# Patient Record
Sex: FEMALE | Race: WHITE | NOT HISPANIC OR LATINO | Employment: UNEMPLOYED | URBAN - METROPOLITAN AREA
[De-identification: names, ages, dates, MRNs, and addresses within clinical notes are randomized per-mention and may not be internally consistent; named-entity substitution may affect disease eponyms.]

---

## 2017-02-06 DIAGNOSIS — Z12.31 ENCOUNTER FOR SCREENING MAMMOGRAM FOR MALIGNANT NEOPLASM OF BREAST: ICD-10-CM

## 2017-02-15 ENCOUNTER — ALLSCRIPTS OFFICE VISIT (OUTPATIENT)
Dept: OTHER | Facility: OTHER | Age: 52
End: 2017-02-15

## 2017-02-17 ENCOUNTER — GENERIC CONVERSION - ENCOUNTER (OUTPATIENT)
Dept: OTHER | Facility: OTHER | Age: 52
End: 2017-02-17

## 2017-10-08 ENCOUNTER — OFFICE VISIT (OUTPATIENT)
Dept: URGENT CARE | Facility: MEDICAL CENTER | Age: 52
End: 2017-10-08
Payer: COMMERCIAL

## 2017-10-08 PROCEDURE — G0382 LEV 3 HOSP TYPE B ED VISIT: HCPCS

## 2018-01-14 VITALS
SYSTOLIC BLOOD PRESSURE: 122 MMHG | HEIGHT: 61 IN | WEIGHT: 171 LBS | BODY MASS INDEX: 32.28 KG/M2 | DIASTOLIC BLOOD PRESSURE: 80 MMHG

## 2018-01-17 NOTE — RESULT NOTES
Verified Results  DX[facility] [address] [id number] Ernestine Morrison     Test Name Result Flag Reference   DXA US BONE DENSITY HEEL +1 4 RF

## 2018-07-03 ENCOUNTER — ANNUAL EXAM (OUTPATIENT)
Dept: GYNECOLOGY | Facility: CLINIC | Age: 53
End: 2018-07-03
Payer: COMMERCIAL

## 2018-07-03 VITALS
SYSTOLIC BLOOD PRESSURE: 144 MMHG | DIASTOLIC BLOOD PRESSURE: 94 MMHG | WEIGHT: 181.4 LBS | BODY MASS INDEX: 34.25 KG/M2 | HEIGHT: 61 IN

## 2018-07-03 DIAGNOSIS — Z12.31 ENCOUNTER FOR SCREENING MAMMOGRAM FOR MALIGNANT NEOPLASM OF BREAST: Primary | ICD-10-CM

## 2018-07-03 DIAGNOSIS — R63.5 WEIGHT GAIN: ICD-10-CM

## 2018-07-03 DIAGNOSIS — Z01.419 ENCOUNTER FOR GYNECOLOGICAL EXAMINATION WITHOUT ABNORMAL FINDING: ICD-10-CM

## 2018-07-03 PROCEDURE — S0612 ANNUAL GYNECOLOGICAL EXAMINA: HCPCS | Performed by: OBSTETRICS & GYNECOLOGY

## 2018-07-03 NOTE — PROGRESS NOTES
Assessment/Plan:         Diagnoses and all orders for this visit:    Encounter for screening mammogram for malignant neoplasm of breast  -     Mammo screening bilateral w 3d & cad; Future    Weight gain  -     TSH, 3rd generation with Free T4 reflex; Future    Encounter for gynecological examination without abnormal finding        Subjective:      Patient ID: Gilbret Champion is a 47 y o  female  HPI  Prior H  C/O weight gain  No other c/o   Colonoscopy 2015--nml  Heel scan 2017 + 1 4    The following portions of the patient's history were reviewed and updated as appropriate: allergies, current medications, past family history, past medical history, past social history, past surgical history and problem list     Review of Systems   Constitutional: Negative  Gastrointestinal: Negative  Genitourinary: Negative  Objective:      /94 (BP Location: Left arm)   Ht 5' 1" (1 549 m)   Wt 82 3 kg (181 lb 6 4 oz)   BMI 34 28 kg/m²          Physical Exam   Constitutional: She appears well-developed and well-nourished  Neck: Normal range of motion  Neck supple  No thyromegaly present  Cardiovascular: Normal rate, regular rhythm and normal heart sounds  Pulmonary/Chest: Right breast exhibits no inverted nipple, no mass, no nipple discharge, no skin change and no tenderness  Left breast exhibits no inverted nipple, no mass, no nipple discharge, no skin change and no tenderness  Abdominal: Soft  Bowel sounds are normal  She exhibits no distension and no mass  There is no tenderness  Hernia confirmed negative in the right inguinal area and confirmed negative in the left inguinal area  Genitourinary: There is no rash or lesion on the right labia  There is no rash or lesion on the left labia  Right adnexum displays no mass, no tenderness and no fullness  Left adnexum displays no mass, no tenderness and no fullness  No erythema or bleeding in the vagina  No vaginal discharge found     Genitourinary Comments: Prior TLH   Lymphadenopathy:        Right: No inguinal adenopathy present  Left: No inguinal adenopathy present

## 2018-07-17 LAB — TSH SERPL-ACNC: 1.21 MIU/L

## 2019-01-10 DIAGNOSIS — Z12.31 ENCOUNTER FOR SCREENING MAMMOGRAM FOR MALIGNANT NEOPLASM OF BREAST: Primary | ICD-10-CM

## 2020-12-22 ENCOUNTER — HOSPITAL ENCOUNTER (OUTPATIENT)
Dept: MAMMOGRAPHY | Facility: HOSPITAL | Age: 55
Discharge: HOME | End: 2020-12-22
Payer: COMMERCIAL

## 2020-12-22 VITALS — HEIGHT: 61 IN | WEIGHT: 181 LBS | BODY MASS INDEX: 34.17 KG/M2

## 2020-12-22 DIAGNOSIS — Z12.31 ENCOUNTER FOR SCREENING MAMMOGRAM FOR MALIGNANT NEOPLASM OF BREAST: ICD-10-CM

## 2020-12-22 PROCEDURE — 77063 BREAST TOMOSYNTHESIS BI: CPT

## 2020-12-22 PROCEDURE — 77067 SCR MAMMO BI INCL CAD: CPT

## 2021-05-04 ENCOUNTER — TELEPHONE (OUTPATIENT)
Dept: INTERNAL MEDICINE CLINIC | Facility: CLINIC | Age: 56
End: 2021-05-04

## 2021-05-04 DIAGNOSIS — E55.9 VITAMIN D DEFICIENCY: Primary | ICD-10-CM

## 2021-05-04 DIAGNOSIS — E78.1 HYPERTRIGLYCERIDEMIA: ICD-10-CM

## 2021-05-04 NOTE — TELEPHONE ENCOUNTER
Dwight Smith I ordered blood test that she needs before next appointment    His fasting blood test ,you  can e-mail

## 2021-05-04 NOTE — TELEPHONE ENCOUNTER
Has her next apt 6/15 - she thought you wanted BW done - there are no orders in Epic - do you want to order if so - email orders to her

## 2021-06-10 LAB
25(OH)D3 SERPL-MCNC: 32 NG/ML (ref 30–100)
CHOLEST SERPL-MCNC: 212 MG/DL
CHOLEST/HDLC SERPL: 4.9 (CALC)
HDLC SERPL-MCNC: 43 MG/DL
LDLC SERPL CALC-MCNC: 135 MG/DL (CALC)
NONHDLC SERPL-MCNC: 169 MG/DL (CALC)
TRIGL SERPL-MCNC: 205 MG/DL

## 2021-06-13 PROBLEM — E78.1 HYPERTRIGLYCERIDEMIA: Status: ACTIVE | Noted: 2021-06-13

## 2021-06-13 PROBLEM — E04.2 MULTIPLE THYROID NODULES: Status: ACTIVE | Noted: 2021-06-13

## 2021-06-13 PROBLEM — E55.9 VITAMIN D DEFICIENCY: Status: ACTIVE | Noted: 2021-06-13

## 2021-06-13 PROBLEM — E78.6 LOW HDL (UNDER 40): Status: ACTIVE | Noted: 2021-06-13

## 2021-06-15 ENCOUNTER — OFFICE VISIT (OUTPATIENT)
Dept: FAMILY MEDICINE CLINIC | Facility: CLINIC | Age: 56
End: 2021-06-15
Payer: COMMERCIAL

## 2021-06-15 ENCOUNTER — TELEPHONE (OUTPATIENT)
Dept: FAMILY MEDICINE CLINIC | Facility: CLINIC | Age: 56
End: 2021-06-15

## 2021-06-15 VITALS
DIASTOLIC BLOOD PRESSURE: 82 MMHG | HEIGHT: 61 IN | TEMPERATURE: 98.2 F | BODY MASS INDEX: 34.17 KG/M2 | SYSTOLIC BLOOD PRESSURE: 130 MMHG | HEART RATE: 80 BPM | WEIGHT: 181 LBS | OXYGEN SATURATION: 98 %

## 2021-06-15 DIAGNOSIS — R03.0 ELEVATED BP WITHOUT DIAGNOSIS OF HYPERTENSION: ICD-10-CM

## 2021-06-15 DIAGNOSIS — E04.2 MULTIPLE THYROID NODULES: ICD-10-CM

## 2021-06-15 DIAGNOSIS — E78.2 MIXED HYPERLIPIDEMIA: Primary | ICD-10-CM

## 2021-06-15 DIAGNOSIS — K63.5 POLYP OF COLON, UNSPECIFIED PART OF COLON, UNSPECIFIED TYPE: ICD-10-CM

## 2021-06-15 DIAGNOSIS — E55.9 VITAMIN D DEFICIENCY: ICD-10-CM

## 2021-06-15 PROCEDURE — 3725F SCREEN DEPRESSION PERFORMED: CPT | Performed by: INTERNAL MEDICINE

## 2021-06-15 PROCEDURE — 3008F BODY MASS INDEX DOCD: CPT | Performed by: INTERNAL MEDICINE

## 2021-06-15 PROCEDURE — 99214 OFFICE O/P EST MOD 30 MIN: CPT | Performed by: INTERNAL MEDICINE

## 2021-06-15 PROCEDURE — 1036F TOBACCO NON-USER: CPT | Performed by: INTERNAL MEDICINE

## 2021-06-15 RX ORDER — CHOLECALCIFEROL (VITAMIN D3) 125 MCG
1 CAPSULE ORAL DAILY
COMMUNITY

## 2021-06-15 RX ORDER — OMEGA-3S/DHA/EPA/FISH OIL/D3 300MG-1000
400 CAPSULE ORAL DAILY
COMMUNITY
End: 2021-06-15 | Stop reason: SDUPTHER

## 2021-06-15 NOTE — PROGRESS NOTES
Assessment/Plan:    1  Mixed hyperlipidemia  Assessment & Plan:  Cholesterol increased from 194-212  Triglyceride increased from 2 1-205    Patient does not want any medication for  hyperlipidemia  She is planning to exercise and lose weight  She is planning to lose weight about 40 to 50 lbs  Advised for low-cholesterol low saturated fat diet  Orders:  -     Lipid panel; Future    2  Vitamin D deficiency  Assessment & Plan:  Vitamin-D level significantly improved from 12 to 32  Patient states sometimes he forgets her vitamin-D tablet  Advised to take daily 5000 IU  Orders:  -     CBC and differential; Future  -     Comprehensive metabolic panel; Future    3  BMI 34 0-34 9,adult    4  Polyp of colon, unspecified part of colon, unspecified type  Assessment & Plan:  Last colonoscopy was done November 2015 by Dr Kd Hall to check with her GI regarding follow-up colonoscopy      5  Multiple thyroid nodules  Assessment & Plan:  She was seen by endocrinologist 2010 had biopsy which was benign last ultrasound December 2020 unchanged has a multinodular goiter    Orders:  -     CBC and differential; Future  -     Comprehensive metabolic panel; Future    6  Elevated BP without diagnosis of hypertension  Assessment & Plan:  Discussed with the patient blood pressure slightly elevated  Goal blood pressure 120/80  Advised to watch diet for salt intake  And lose weight and exercise  Orders:  -     CBC and differential; Future  -     Comprehensive metabolic panel; Future    BMI Counseling: Body mass index is 34 2 kg/m²  The BMI is above normal  Nutrition recommendations include decreasing portion sizes, decreasing fast food intake, consuming healthier snacks, limiting drinks that contain sugar, moderation in carbohydrate intake, reducing intake of saturated and trans fat and reducing intake of cholesterol  Exercise recommendations include exercising 3-5 times per week and strength training exercises  No pharmacotherapy was ordered  Subjective:  Patient presents for follow-up  Patient ID: Brandon Almodovar is a 48 y o  female  HPI   80-year-old white female patient presents for follow-up her medical problems  She denies any chest pain, shortness of breath, pain in abdomen  Denies any cough, fever, chills she denies any nausea, vomiting, diarrhea pain she got her COVID-19 vaccination  Overall she is feeling better  The following portions of the patient's history were reviewed and updated as appropriate:     Past Medical History:  She has a past medical history of Adenomyosis, BMI 34 0-34 9,adult (6/15/2021), Colon polyp (6/15/2021), Elevated BP without diagnosis of hypertension (6/15/2021), Fatigue, Hypertriglyceridemia (6/13/2021), Low HDL (under 40) (6/13/2021), Microscopic hematuria, Mixed hyperlipidemia (6/15/2021), Multiple thyroid nodules (6/13/2021), Thyroid disease, Vitamin D deficiency (6/13/2021), and Weight gain  ,  _______________________________________________________________________  Past Surgical History:   has a past surgical history that includes Laparoscopic cholecystectomy; Dilation and curettage of uterus; Polypectomy; Hysteroscopy; Laparoscopic total hysterectomy; Hysterectomy; Mammo (historical) (12/23/2020); and Colonoscopy (11/02/2015)  ,  _______________________________________________________________________  Family History:  family history includes Breast cancer in her maternal aunt; Heart attack in her father; Heart disease in her father; No Known Problems in her daughter, maternal aunt, maternal aunt, maternal aunt, maternal grandfather, maternal grandmother, mother, paternal aunt, paternal grandfather, paternal grandmother, and son ,  _______________________________________________________________________  Social History:   reports that she has never smoked  She has never used smokeless tobacco  She reports current alcohol use   She reports that she does not use drugs ,  _______________________________________________________________________  Allergies:  has No Known Allergies     _______________________________________________________________________  Current Outpatient Medications   Medication Sig Dispense Refill    Cholecalciferol (Vitamin D) 125 MCG (5000 UT) CAPS Take 1 caplet by mouth daily       No current facility-administered medications for this visit      _______________________________________________________________________  Review of Systems   Constitutional: Negative for chills, fatigue and fever  HENT: Negative for congestion, ear pain, hearing loss, nosebleeds, sinus pain, sore throat and trouble swallowing  Eyes: Negative for discharge, redness and visual disturbance  Respiratory: Negative for cough, chest tightness and shortness of breath  Cardiovascular: Negative for chest pain and palpitations  Gastrointestinal: Negative for abdominal pain, blood in stool, constipation, diarrhea, nausea and vomiting  Genitourinary: Negative for dysuria, flank pain, frequency and hematuria  Musculoskeletal: Negative for arthralgias, myalgias and neck pain  Skin: Negative for color change and rash  Neurological: Negative for dizziness, speech difficulty, weakness and headaches  Hematological: Does not bruise/bleed easily  Psychiatric/Behavioral: Negative for agitation and behavioral problems  Objective:  Vitals:    06/15/21 0920   BP: 130/82   BP Location: Left arm   Patient Position: Sitting   Cuff Size: Adult   Pulse: 80   Temp: 98 2 °F (36 8 °C)   TempSrc: Tympanic   SpO2: 98%   Weight: 82 1 kg (181 lb)   Height: 5' 1" (1 549 m)     Body mass index is 34 2 kg/m²  Physical Exam  Vitals and nursing note reviewed  Constitutional:       General: She is not in acute distress  Appearance: Normal appearance  HENT:      Head: Normocephalic and atraumatic        Right Ear: Ear canal and external ear normal       Left Ear: Ear canal and external ear normal       Nose: Nose normal       Mouth/Throat:      Mouth: Mucous membranes are moist    Eyes:      General: No scleral icterus  Extraocular Movements: Extraocular movements intact  Conjunctiva/sclera: Conjunctivae normal    Cardiovascular:      Rate and Rhythm: Normal rate and regular rhythm  Pulses: Normal pulses  Heart sounds: Normal heart sounds  No murmur heard  Pulmonary:      Effort: Pulmonary effort is normal  No respiratory distress  Breath sounds: Normal breath sounds  Abdominal:      General: Bowel sounds are normal       Palpations: Abdomen is soft  Tenderness: There is no abdominal tenderness  Musculoskeletal:         General: Normal range of motion  Cervical back: Normal range of motion and neck supple  No muscular tenderness  Right lower leg: No edema  Left lower leg: No edema  Skin:     General: Skin is warm  Findings: No rash  Neurological:      General: No focal deficit present  Mental Status: She is alert and oriented to person, place, and time     Psychiatric:         Mood and Affect: Mood normal          Behavior: Behavior normal

## 2021-06-15 NOTE — ASSESSMENT & PLAN NOTE
Vitamin-D level significantly improved from 12 to 32  Patient states sometimes he forgets her vitamin-D tablet  Advised to take daily 5000 IU

## 2021-06-15 NOTE — ASSESSMENT & PLAN NOTE
Discussed with the patient blood pressure slightly elevated  Goal blood pressure 120/80  Advised to watch diet for salt intake  And lose weight and exercise

## 2021-06-15 NOTE — ASSESSMENT & PLAN NOTE
Cholesterol increased from 194-212  Triglyceride increased from 2 1-205    Patient does not want any medication for  hyperlipidemia  She is planning to exercise and lose weight  She is planning to lose weight about 40 to 50 lbs  Advised for low-cholesterol low saturated fat diet

## 2021-06-15 NOTE — ASSESSMENT & PLAN NOTE
She was seen by endocrinologist 2010 had biopsy which was benign last ultrasound December 2020 unchanged has a multinodular goiter

## 2021-06-15 NOTE — ASSESSMENT & PLAN NOTE
Last colonoscopy was done November 2015 by Dr Gladis Lees   Advised to check with her GI regarding follow-up colonoscopy

## 2021-06-15 NOTE — PATIENT INSTRUCTIONS
Patient advised to continue present medications discussed with the patient medications and laboratory data in detail  Follow-up with me in 6 months    If any blood test was ordered please do 1 week prior to next appointment  If you have any questions please call the office [phone]

## 2021-10-07 ENCOUNTER — TELEPHONE (OUTPATIENT)
Dept: INTERNAL MEDICINE CLINIC | Facility: CLINIC | Age: 56
End: 2021-10-07
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
## 2021-12-07 ENCOUNTER — RA CDI HCC (OUTPATIENT)
Dept: OTHER | Facility: HOSPITAL | Age: 56
End: 2021-12-07

## 2022-01-14 ENCOUNTER — TELEPHONE (OUTPATIENT)
Dept: GASTROENTEROLOGY | Facility: CLINIC | Age: 57
End: 2022-01-14

## 2022-01-14 NOTE — TELEPHONE ENCOUNTER
Call went out in 2021 to remind patient to schedule her colonoscopy with Dr Marco A Styles for hx of polyps  Patient didn't respond so I left another message for her to call to schedule  Will call again in 2 weeks if she doesn't return call

## 2022-02-10 ENCOUNTER — PREP FOR PROCEDURE (OUTPATIENT)
Dept: GASTROENTEROLOGY | Facility: CLINIC | Age: 57
End: 2022-02-10

## 2022-02-10 DIAGNOSIS — Z86.010 HISTORY OF COLON POLYPS: Primary | ICD-10-CM

## 2022-02-10 NOTE — TELEPHONE ENCOUNTER
Scheduled date of colonoscopy (as of today):3/18/22    Physician performing colonoscopy:Sherry    Location of colonoscopy:[facility]    Bowel prep reviewed with patient:Antoine/Kelesy/Mag    Instructions reviewed with patient by:DILIA    Clearances: none

## 2022-03-01 ENCOUNTER — OFFICE VISIT (OUTPATIENT)
Dept: PODIATRY | Facility: CLINIC | Age: 57
End: 2022-03-01
Payer: COMMERCIAL

## 2022-03-01 VITALS
DIASTOLIC BLOOD PRESSURE: 82 MMHG | RESPIRATION RATE: 17 BRPM | WEIGHT: 181 LBS | BODY MASS INDEX: 34.17 KG/M2 | SYSTOLIC BLOOD PRESSURE: 130 MMHG | HEIGHT: 61 IN

## 2022-03-01 DIAGNOSIS — M79.671 RIGHT FOOT PAIN: ICD-10-CM

## 2022-03-01 DIAGNOSIS — M77.41 METATARSALGIA OF BOTH FEET: Primary | ICD-10-CM

## 2022-03-01 DIAGNOSIS — B07.0 PLANTAR WARTS: ICD-10-CM

## 2022-03-01 DIAGNOSIS — B35.9 DERMATOPHYTOSIS: ICD-10-CM

## 2022-03-01 DIAGNOSIS — M77.42 METATARSALGIA OF BOTH FEET: Primary | ICD-10-CM

## 2022-03-01 PROCEDURE — 99203 OFFICE O/P NEW LOW 30 MIN: CPT | Performed by: PODIATRIST

## 2022-03-01 PROCEDURE — 17110 DESTRUCTION B9 LES UP TO 14: CPT | Performed by: PODIATRIST

## 2022-03-01 RX ORDER — KETOCONAZOLE 20 MG/G
CREAM TOPICAL DAILY
Qty: 60 G | Refills: 1 | Status: SHIPPED | OUTPATIENT
Start: 2022-03-01 | End: 2022-03-15 | Stop reason: SDUPTHER

## 2022-03-01 NOTE — PROGRESS NOTES
Assessment/Plan:  Metatarsalgia bilateral   Acquired pes planus  Pain upon ambulation  Dermatophytosis heel bilateral   Verruca submetatarsal 3 right foot  Pain  Plan  Foot exam performed  Patient educated on condition  All abnormal skin debrided  Patient be started on topical antifungal   In addition right foot lesion debrided  Cantharone applied  Patient advised on aftercare  Return for follow-up         Diagnoses and all orders for this visit:    Metatarsalgia of both feet    Dermatophytosis  -     ketoconazole (NIZORAL) 2 % cream; Apply topically daily    Plantar warts    Right foot pain          Subjective:  Patient has burning pain in the ball the foot  She has pain upon ambulation  This has been ongoing  No history of trauma  No Known Allergies      Current Outpatient Medications:     Cholecalciferol (Vitamin D) 125 MCG (5000 UT) CAPS, Take 1 caplet by mouth daily, Disp: , Rfl:     ketoconazole (NIZORAL) 2 % cream, Apply topically daily, Disp: 60 g, Rfl: 1    Patient Active Problem List   Diagnosis    Vitamin D deficiency    Low HDL (under 40)    Hypertriglyceridemia    Multiple thyroid nodules    Mixed hyperlipidemia    BMI 34 0-34 9,adult    Colon polyp    Elevated BP without diagnosis of hypertension          Patient ID: Grace Berry is a 55 y o  female  HPI    The following portions of the patient's history were reviewed and updated as appropriate:     family history includes Breast cancer in her maternal aunt; Heart attack in her father; Heart disease in her father; No Known Problems in her daughter, maternal aunt, maternal aunt, maternal aunt, maternal grandfather, maternal grandmother, mother, paternal aunt, paternal grandfather, paternal grandmother, and son  reports that she has never smoked  She has never used smokeless tobacco  She reports current alcohol use  She reports that she does not use drugs      Vitals:    03/01/22 1423   BP: 130/82   Resp: 17 Review of Systems      Objective:  Patient's shoes and socks removed  Foot Exam    General  General Appearance: appears stated age and healthy   Orientation: alert and oriented to person, place, and time   Affect: appropriate   Gait: antalgic       Right Foot/Ankle     Inspection and Palpation  Ecchymosis: none  Tenderness: bony tenderness   Swelling: dorsum   Arch: pes cavus  Hammertoes: fifth toe  Skin Exam: dry skin, skin changes and warts; Neurovascular  Dorsalis pedis: 3+  Posterior tibial: 3+      Left Foot/Ankle      Inspection and Palpation  Ecchymosis: none  Tenderness: bony tenderness   Swelling: dorsum   Arch: pes cavus  Hammertoes: fifth toe  Skin Exam: dry skin and skin changes; Neurovascular  Dorsalis pedis: 3+  Posterior tibial: 3+        Physical Exam  Vitals and nursing note reviewed  Constitutional:       Appearance: Normal appearance  Cardiovascular:      Rate and Rhythm: Normal rate and regular rhythm  Pulses:           Dorsalis pedis pulses are 3+ on the right side and 3+ on the left side  Posterior tibial pulses are 3+ on the right side and 3+ on the left side  Musculoskeletal:      Right foot: Bony tenderness present  Left foot: Bony tenderness present  Feet:      Right foot:      Skin integrity: Dry skin present  Left foot:      Skin integrity: Dry skin present  Skin:     Capillary Refill: Capillary refill takes less than 2 seconds  Comments: Patient has significant xerosis of skin  She has fissures of the heel secondary to dermatophytosis  She has callus formation the ball of her foot  Xerosis of skin noted bilateral   In addition, patient has 0 5 centimeter squared plantar verruca submetatarsal 3 right foot  Neurological:      Mental Status: She is alert  Psychiatric:         Mood and Affect: Mood normal          Behavior: Behavior normal          Thought Content:  Thought content normal          Judgment: Judgment normal

## 2022-03-15 ENCOUNTER — OFFICE VISIT (OUTPATIENT)
Dept: PODIATRY | Facility: CLINIC | Age: 57
End: 2022-03-15
Payer: COMMERCIAL

## 2022-03-15 VITALS — HEIGHT: 61 IN | RESPIRATION RATE: 17 BRPM | WEIGHT: 181 LBS | BODY MASS INDEX: 34.17 KG/M2

## 2022-03-15 DIAGNOSIS — B07.0 PLANTAR WARTS: ICD-10-CM

## 2022-03-15 DIAGNOSIS — M77.41 METATARSALGIA OF BOTH FEET: Primary | ICD-10-CM

## 2022-03-15 DIAGNOSIS — M79.671 RIGHT FOOT PAIN: ICD-10-CM

## 2022-03-15 DIAGNOSIS — B35.9 DERMATOPHYTOSIS: ICD-10-CM

## 2022-03-15 DIAGNOSIS — M77.42 METATARSALGIA OF BOTH FEET: Primary | ICD-10-CM

## 2022-03-15 PROCEDURE — 99212 OFFICE O/P EST SF 10 MIN: CPT | Performed by: PODIATRIST

## 2022-03-15 PROCEDURE — 17110 DESTRUCTION B9 LES UP TO 14: CPT | Performed by: PODIATRIST

## 2022-03-15 RX ORDER — KETOCONAZOLE 20 MG/G
CREAM TOPICAL DAILY
Qty: 60 G | Refills: 1 | Status: SHIPPED | OUTPATIENT
Start: 2022-03-15 | End: 2022-04-20

## 2022-03-15 NOTE — PROGRESS NOTES
Assessment/Plan:  Metatarsalgia bilateral   Acquired pes planus  Pain upon ambulation  Dermatophytosis heel bilateral   Verruca submetatarsal 3 right foot  Pain      Plan  Foot exam performed  Patient educated on condition  All abnormal skin debrided  Patient be started on topical antifungal   In addition right foot lesion debrided  Cantharone applied  Patient advised on aftercare  Return for follow-up            Diagnoses and all orders for this visit:     Metatarsalgia of both feet     Dermatophytosis  -     ketoconazole (NIZORAL) 2 % cream; Apply topically daily     Plantar warts     Right foot pain            Subjective:  Patient has burning pain in the ball the foot  She has pain upon ambulation  This has been ongoing  No history of trauma      No Known Allergies        Current Outpatient Medications:     Cholecalciferol (Vitamin D) 125 MCG (5000 UT) CAPS, Take 1 caplet by mouth daily, Disp: , Rfl:     ketoconazole (NIZORAL) 2 % cream, Apply topically daily, Disp: 60 g, Rfl: 1         Patient Active Problem List   Diagnosis    Vitamin D deficiency    Low HDL (under 40)    Hypertriglyceridemia    Multiple thyroid nodules    Mixed hyperlipidemia    BMI 34 0-34 9,adult    Colon polyp    Elevated BP without diagnosis of hypertension             Patient ID: Messi Moreno is a 55 y o  female      HPI     The following portions of the patient's history were reviewed and updated as appropriate:      family history includes Breast cancer in her maternal aunt; Heart attack in her father; Heart disease in her father; No Known Problems in her daughter, maternal aunt, maternal aunt, maternal aunt, maternal grandfather, maternal grandmother, mother, paternal aunt, paternal grandfather, paternal grandmother, and son        reports that she has never smoked  She has never used smokeless tobacco  She reports current alcohol use   She reports that she does not use drugs         Objective:  Patient's shoes and socks removed  Foot Exam     General  General Appearance: appears stated age and healthy   Orientation: alert and oriented to person, place, and time   Affect: appropriate   Gait: antalgic         Right Foot/Ankle      Inspection and Palpation  Ecchymosis: none  Tenderness: bony tenderness   Swelling: dorsum   Arch: pes cavus  Hammertoes: fifth toe  Skin Exam: dry skin, skin changes and warts;      Neurovascular  Dorsalis pedis: 3+  Posterior tibial: 3+        Left Foot/Ankle       Inspection and Palpation  Ecchymosis: none  Tenderness: bony tenderness   Swelling: dorsum   Arch: pes cavus  Hammertoes: fifth toe  Skin Exam: dry skin and skin changes;      Neurovascular  Dorsalis pedis: 3+  Posterior tibial: 3+           Physical Exam  Vitals and nursing note reviewed  Constitutional:       Appearance: Normal appearance  Cardiovascular:      Rate and Rhythm: Normal rate and regular rhythm  Pulses:           Dorsalis pedis pulses are 3+ on the right side and 3+ on the left side  Posterior tibial pulses are 3+ on the right side and 3+ on the left side  Musculoskeletal:      Right foot: Bony tenderness present  Left foot: Bony tenderness present  Feet:      Right foot:      Skin integrity: Dry skin present  Left foot:      Skin integrity: Dry skin present  Skin:     Capillary Refill: Capillary refill takes less than 2 seconds  Comments: Patient has significant xerosis of skin  She has fissures of the heel secondary to dermatophytosis  She has callus formation the ball of her foot  Xerosis of skin noted bilateral   In addition, patient has 0 5 centimeter squared plantar verruca submetatarsal 3 right foot  This is contained within a bullae  Lesion debrided  Approximately 50% resolved  Neurological:      Mental Status: She is alert  Psychiatric:         Mood and Affect: Mood normal          Behavior: Behavior normal          Thought Content:  Thought content normal  Judgment: Judgment normal

## 2022-04-05 ENCOUNTER — OFFICE VISIT (OUTPATIENT)
Dept: PODIATRY | Facility: CLINIC | Age: 57
End: 2022-04-05
Payer: COMMERCIAL

## 2022-04-05 VITALS
RESPIRATION RATE: 16 BRPM | SYSTOLIC BLOOD PRESSURE: 130 MMHG | HEIGHT: 61 IN | WEIGHT: 181 LBS | DIASTOLIC BLOOD PRESSURE: 82 MMHG | BODY MASS INDEX: 34.17 KG/M2

## 2022-04-05 DIAGNOSIS — S91.301A OPEN WOUND OF RIGHT FOOT, INITIAL ENCOUNTER: Primary | ICD-10-CM

## 2022-04-05 PROCEDURE — 99212 OFFICE O/P EST SF 10 MIN: CPT | Performed by: PODIATRIST

## 2022-04-05 NOTE — PROGRESS NOTES
Assessment/Plan:  Metatarsalgia secondary to verruca/wound right foot  Dermatophytosis, resolving  Plan  Foot exam performed  Patient educated on condition  Lesion debrided  Gentian violet dry sterile dressing applied  Patient will watch for signs of infection recurrence       Diagnoses and all orders for this visit:    Open wound of right foot, initial encounter          Subjective:  Patient has strong reaction last treatment  She has some pain in the ball of foot  She presents for evaluation    No Known Allergies      Current Outpatient Medications:     Cholecalciferol (Vitamin D) 125 MCG (5000 UT) CAPS, Take 1 caplet by mouth daily, Disp: , Rfl:     ketoconazole (NIZORAL) 2 % cream, Apply topically daily, Disp: 60 g, Rfl: 1    Patient Active Problem List   Diagnosis    Vitamin D deficiency    Low HDL (under 40)    Hypertriglyceridemia    Multiple thyroid nodules    Mixed hyperlipidemia    BMI 34 0-34 9,adult    Colon polyp    Elevated BP without diagnosis of hypertension          Patient ID: Gordy Lieberman is a 55 y o  female  HPI    The following portions of the patient's history were reviewed and updated as appropriate:     family history includes Breast cancer in her maternal aunt; Heart attack in her father; Heart disease in her father; No Known Problems in her daughter, maternal aunt, maternal aunt, maternal aunt, maternal grandfather, maternal grandmother, mother, paternal aunt, paternal grandfather, paternal grandmother, and son  reports that she has never smoked  She has never used smokeless tobacco  She reports current alcohol use  She reports that she does not use drugs  Vitals:    04/05/22 1444   BP: 130/82   Resp: 16       Review of Systems      Objective:  Patient's shoes and socks removed     Foot ExamPhysical Exam       Foot Exam     General  General Appearance: appears stated age and healthy   Orientation: alert and oriented to person, place, and time   Affect: appropriate   Gait: antalgic         Right Foot/Ankle      Inspection and Palpation  Ecchymosis: none  Tenderness: bony tenderness   Swelling: dorsum   Arch: pes cavus  Hammertoes: fifth toe  Skin Exam: dry skin, skin changes and warts;      Neurovascular  Dorsalis pedis: 3+  Posterior tibial: 3+        Left Foot/Ankle       Inspection and Palpation  Ecchymosis: none  Tenderness: bony tenderness   Swelling: dorsum   Arch: pes cavus  Hammertoes: fifth toe  Skin Exam: dry skin and skin changes;      Neurovascular  Dorsalis pedis: 3+  Posterior tibial: 3+           Physical Exam  Vitals and nursing note reviewed  Constitutional:       Appearance: Normal appearance  Cardiovascular:      Rate and Rhythm: Normal rate and regular rhythm       Pulses:           Dorsalis pedis pulses are 3+ on the right side and 3+ on the left side         Posterior tibial pulses are 3+ on the right side and 3+ on the left side  Musculoskeletal:      Right foot: Bony tenderness present       Left foot: Bony tenderness present  Feet:      Right foot:      Skin integrity: Dry skin present       Left foot:      Skin integrity: Dry skin present  Skin:     Capillary Refill: Capillary refill takes less than 2 seconds       Comments: Patient has significant xerosis of skin   She has fissures of the heel secondary to dermatophytosis   She has callus formation the ball of her foot   Xerosis of skin noted bilateral   In addition, patient has 0 5 centimeter squared plantar verruca submetatarsal 3 right foot  This is contained within a bullae  Lesion debrided  Approximately 100% resolved  Neurological:      Mental Status: She is alert  Psychiatric:         Mood and Affect: Mood normal          BehaviorMaurita Dagmita         Thought Content:  Thought content normal          Judgment: Judgment normal

## 2022-04-20 PROBLEM — F41.9 ANXIETY: Status: ACTIVE | Noted: 2022-04-20

## 2023-11-09 NOTE — PROGRESS NOTES
ADULT ANNUAL [facility] INTERNAL MEDICINE    NAME: Lakesha Delatrore  AGE: 65 y.o. SEX: female  : 1965     DATE: 2023     Assessment and Plan:     Problem List Items Addressed This Visit    None      Immunizations and preventive care screenings were discussed with patient today. Appropriate education was printed on patient's after visit summary. Counseling:  {Annual Physical; Counselin}         No follow-ups on file. Chief Complaint:     No chief complaint on file. History of Present Illness:     Adult Annual Physical   Patient here for a comprehensive physical exam. The patient reports {problems:36589}. Diet and Physical Activity  Diet/Nutrition: {annual physical; diet:04158092}. Exercise: {annual physical; exercise:41711808}. Depression Screening  PHQ-2/9 Depression Screening           General Health  Sleep: {annual physical; sleep:2102}. Hearing: {annual physical; hearin}. Vision: {annual physical; vision:}. Dental: {annual physical; dental:25517984}. /GYN Health  Patient is: {Menopause:74572}  Last menstrual period: ***  Contraceptive method: {contraceptive options:}. Advanced Care Planning  Do you have an advanced directive? {YES/NO:}  Do you have a durable medical power of ?  {YES/NO:}     Review of Systems:     Review of Systems   Past Medical History:     Past Medical History:   Diagnosis Date    Adenomyosis     BMI 34.0-34.9,adult 6/15/2021    Colon polyp 6/15/2021    Elevated BP without diagnosis of hypertension 6/15/2021    Fatigue     Hypertriglyceridemia 2021    Low HDL (under 40) 2021    Microscopic hematuria     Mixed hyperlipidemia 6/15/2021    Multiple thyroid nodules 2021    Thyroid disease     Vitamin D deficiency 2021    Weight gain       Past Surgical History:     Past Surgical History:   Procedure Laterality Date COLONOSCOPY  11/02/2015    by Dr. Yolanda Kerr; advise f/u in 5 yrs, per pt    COLONOSCOPY  03/23/2022    1 polyp tubular adenoma biopsy negative for microscopic colitis    DILATION AND CURETTAGE OF UTERUS      EGD  05/03/2022    Gastric polyps-biopsy showed fundic gland polyp, nonsevere esophagitis biopsy showed reflux, biopsies stomach negative for H. pylori, biopsy of the duodenum negative for celiac by Dr. Tiffani Duran (HISTORICAL)  12/23/2020    [facility] [place]    POLYPECTOMY        Social History:     Social History     Socioeconomic History    Marital status: /Civil Union     Spouse name: Not on file    Number of children: Not on file    Years of education: Not on file    Highest education level: Not on file   Occupational History    Occupation: Homemaker/housewife    Tobacco Use    Smoking status: Never    Smokeless tobacco: Never   Vaping Use    Vaping Use: Never used   Substance and Sexual Activity    Alcohol use:  Yes     Alcohol/week: 1.0 - 2.0 standard drink of alcohol     Types: 1 - 2 Standard drinks or equivalent per week     Comment: Nondrinker - As per [employer]    Drug use: No    Sexual activity: Not on file   Other Topics Concern    Not on file   Social History Narrative    Lives with spouse    Annual dental checkup: Follows dentist    Pap smear: Advise to follow up with her gyn    - As per [employer]     Social Determinants of Health     Financial Resource Strain: Not on file   Food Insecurity: Not on file   Transportation Needs: Not on file   Physical Activity: Not on file   Stress: Not on file   Social Connections: Not on file   Intimate Partner Violence: Not on file   Housing Stability: Not on file      Family History:     Family History   Problem Relation Age of Onset    No Known Problems Mother     Heart attack Father     Heart disease Father     No Known Problems Daughter No Known Problems Maternal Grandmother     No Known Problems Maternal Grandfather     No Known Problems Paternal Grandmother     No Known Problems Paternal Grandfather     No Known Problems Son     Breast cancer Maternal Aunt     No Known Problems Maternal Aunt     No Known Problems Maternal Aunt     No Known Problems Maternal Aunt     No Known Problems Paternal Aunt       Current Medications:     Current Outpatient Medications   Medication Sig Dispense Refill    Cholecalciferol (Vitamin D) 125 MCG (5000 UT) CAPS Take 1 caplet by mouth daily (Patient not taking: No sig reported)      dicyclomine (BENTYL) 10 mg capsule Take 1 capsule (10 mg total) by mouth in the morning and 1 capsule (10 mg total) before bedtime. 60 capsule 5    ketoconazole (NIZORAL) 2 % cream Apply topically daily 60 g 1     No current facility-administered medications for this visit. Allergies: Allergies   Allergen Reactions    Gluten Meal - Food Allergy Diarrhea      Physical Exam:     There were no vitals taken for this visit.     Physical Exam     Jasson Candelario LPN  [facility] INTERNAL MEDICINE

## 2023-11-13 ENCOUNTER — OFFICE VISIT (OUTPATIENT)
Dept: INTERNAL MEDICINE CLINIC | Facility: CLINIC | Age: 58
End: 2023-11-13
Payer: COMMERCIAL

## 2023-11-13 VITALS
DIASTOLIC BLOOD PRESSURE: 50 MMHG | HEART RATE: 88 BPM | SYSTOLIC BLOOD PRESSURE: 88 MMHG | TEMPERATURE: 97.7 F | OXYGEN SATURATION: 92 %

## 2023-11-13 DIAGNOSIS — R35.0 URINARY FREQUENCY: ICD-10-CM

## 2023-11-13 DIAGNOSIS — R61 DIAPHORESIS: ICD-10-CM

## 2023-11-13 DIAGNOSIS — R10.84 GENERALIZED ABDOMINAL PAIN: ICD-10-CM

## 2023-11-13 DIAGNOSIS — E55.9 VITAMIN D DEFICIENCY: ICD-10-CM

## 2023-11-13 DIAGNOSIS — E78.2 MIXED HYPERLIPIDEMIA: ICD-10-CM

## 2023-11-13 DIAGNOSIS — R55 NEAR SYNCOPE: Primary | ICD-10-CM

## 2023-11-13 PROBLEM — R10.9 PAIN IN THE ABDOMEN: Status: ACTIVE | Noted: 2023-11-13

## 2023-11-13 PROCEDURE — 99214 OFFICE O/P EST MOD 30 MIN: CPT | Performed by: INTERNAL MEDICINE

## 2023-11-13 NOTE — PROGRESS NOTES
Assessment/Plan:    1. Near syncope  Assessment & Plan:  In the office she was feeling like this is going to pass out. Although there is no syncopal episode. She was also diaphoretic in the office. She was sweating. She has a abdominal pain epigastric and lower abdominal pain. She has had diarrhea for last almost 4 days. Although no vomiting. She denies any cough, fever, sore throat, chest pain, shortness of breath. Her blood pressure was 88/50. Annead was called to take her to emergency room. She was discharged in stable condition from the office to emergency room. Rule out dehydration versus gastroenteritis versus UTI/sepsis. She has a frequency of urination and burning on urination since today morning. Urine dipstick in the office revealed positive leukocyte positive blood and some trace nitrate positive. 2. Urinary frequency  Assessment & Plan:  See as above      3. Diaphoresis  Assessment & Plan:  See as above      4. Vitamin D deficiency  Assessment & Plan:  She is supposed take vitamin D 5000 IU daily but she is not taking any vitamin D supplement. We will follow vitamin D level and advise accordingly. Orders:  -     Vitamin D 25 hydroxy; Future  -     Vitamin D 25 hydroxy    5. Mixed hyperlipidemia  Assessment & Plan:  Last cholesterol 212, triglyceride 205, HDL 43, . She has been watching her diet for cholesterol and carbs. Will follow lipid panel And advise accordingly. Orders:  -     Lipid panel; Future  -     Lipid panel    6. Generalized abdominal pain  Assessment & Plan:  She has a epigastric and lower abdominal pain and also diarrhea possible acute viral gastroenteritis. Rule out other etiology. Due to her other symptoms of near syncope and diaphoresis she was referred to emergency room. See above under near syncope assessment and plan for detail.       We will order a vitamin D level and lipid panel to be done after she had an evaluation at emergency room today Subjective: Patient presents with diarrhea, abdominal pain and UTI symptoms. Patient ID: Kiana Amato is a 65 y.o. female. HPI  81-year-old white female presented to the office with complaint of diarrhea, abdominal pain, urinary frequency and burning on urination. She has a diarrhea for last 4 days. Denies any vomiting. She has a generalized abdominal pain. She developed significant diaphoresis and sweating and also she felt she was going to pass out while she was in the office. Although she denies any chest pain or shortness of breath. She does not have any cough or sore throat or fever. The following portions of the patient's history were reviewed and updated as appropriate:     Past Medical History:  She has a past medical history of Adenomyosis, BMI 34.0-34.9,adult (06/15/2021), Colon polyp (06/15/2021), Diaphoresis (11/13/2023), Elevated BP without diagnosis of hypertension (06/15/2021), Fatigue, Hypertriglyceridemia (06/13/2021), Low HDL (under 40) (06/13/2021), Microscopic hematuria, Mixed hyperlipidemia (06/15/2021), Multiple thyroid nodules (06/13/2021), Near syncope (11/13/2023), Pain in the abdomen (11/13/2023), Thyroid disease, Urinary frequency (11/13/2023), Vitamin D deficiency (06/13/2021), and Weight gain. ,  _______________________________________________________________________  Past Surgical History:   has a past surgical history that includes Laparoscopic cholecystectomy; Dilation and curettage of uterus; Polypectomy; Hysteroscopy; Laparoscopic total hysterectomy; Hysterectomy; Mammo (historical) (12/23/2020); Colonoscopy (11/02/2015); Colonoscopy (03/23/2022); and EGD (05/03/2022). ,  _______________________________________________________________________  Family History:  family history includes Breast cancer in her maternal aunt;  Heart attack in her father; Heart disease in her father; No Known Problems in her daughter, maternal aunt, maternal aunt, maternal aunt, maternal grandfather, maternal grandmother, mother, paternal aunt, paternal grandfather, paternal grandmother, and son.,  _______________________________________________________________________  Social History:   reports that she has never smoked. She has never used smokeless tobacco. She reports current alcohol use of about 1.0 - 2.0 standard drink of alcohol per week. She reports that she does not use drugs. ,  _______________________________________________________________________  Allergies:  is allergic to gluten meal - food allergy. .  _______________________________________________________________________  No current outpatient medications on file. No current facility-administered medications for this visit.     _______________________________________________________________________  Review of Systems   Constitutional:  Negative for chills and fever. HENT:  Negative for congestion, ear pain, hearing loss, nosebleeds, sinus pain, sore throat and trouble swallowing. Eyes:  Negative for discharge, redness and visual disturbance. Respiratory:  Negative for cough, chest tightness and shortness of breath. Cardiovascular:  Negative for chest pain and palpitations. Gastrointestinal:  Positive for abdominal pain and diarrhea. Negative for blood in stool, constipation, nausea and vomiting. Genitourinary:  Positive for dysuria and frequency. Negative for flank pain and hematuria. Musculoskeletal:  Negative for arthralgias, myalgias and neck pain. Skin:  Negative for color change and rash. Neurological:  Positive for dizziness (She is feeling like she is going to pass out.). Negative for speech difficulty, weakness and headaches. Hematological:  Does not bruise/bleed easily. Psychiatric/Behavioral:  Negative for agitation and behavioral problems.             Objective:  Vitals:    11/13/23 2028   BP: (!) 88/50   Pulse: 88   Temp: 97.7 °F (36.5 °C)   SpO2: 92%     There is no height or weight on file to calculate BMI. Physical Exam  Vitals and nursing note reviewed. Constitutional:       General: She is not in acute distress. Appearance: Normal appearance. HENT:      Head: Normocephalic and atraumatic. Right Ear: Ear canal and external ear normal.      Left Ear: Ear canal and external ear normal.      Nose: Nose normal.      Mouth/Throat:      Mouth: Mucous membranes are moist.   Eyes:      General: No scleral icterus. Right eye: No discharge. Left eye: No discharge. Extraocular Movements: Extraocular movements intact. Conjunctiva/sclera: Conjunctivae normal.   Cardiovascular:      Rate and Rhythm: Normal rate and regular rhythm. Pulses: Normal pulses. Heart sounds: No murmur heard. Pulmonary:      Effort: Pulmonary effort is normal. No respiratory distress. Breath sounds: Normal breath sounds. No wheezing. Abdominal:      General: Bowel sounds are normal.      Palpations: Abdomen is soft. Tenderness: There is abdominal tenderness (Has a generalized abdominal pain). There is no guarding or rebound. Musculoskeletal:         General: Normal range of motion. Cervical back: Normal range of motion and neck supple. No muscular tenderness. Right lower leg: No edema. Left lower leg: No edema. Skin:     General: Skin is warm. Findings: No rash. Neurological:      General: No focal deficit present. Mental Status: She is alert and oriented to person, place, and time. Motor: No weakness. Psychiatric:         Mood and Affect: Mood normal.         Behavior: Behavior normal.       I spent 30 minutes with the patient today.   More than 50% time spent for reviewing of external notes, reviewing of the results of diagnostics test, management of care, patient education and ordering of test.

## 2023-11-14 NOTE — ASSESSMENT & PLAN NOTE
Last cholesterol 212, triglyceride 205, HDL 43, . She has been watching her diet for cholesterol and carbs. Will follow lipid panel And advise accordingly.

## 2023-11-14 NOTE — ASSESSMENT & PLAN NOTE
In the office she was feeling like this is going to pass out. Although there is no syncopal episode. She was also diaphoretic in the office. She was sweating. She has a abdominal pain epigastric and lower abdominal pain. She has had diarrhea for last almost 4 days. Although no vomiting. She denies any cough, fever, sore throat, chest pain, shortness of breath. Her blood pressure was 88/50. Faustino was called to take her to emergency room. She was discharged in stable condition from the office to emergency room. Rule out dehydration versus gastroenteritis versus UTI/sepsis. She has a frequency of urination and burning on urination since today morning. Urine dipstick in the office revealed positive leukocyte positive blood and some trace nitrate positive.

## 2023-11-14 NOTE — ASSESSMENT & PLAN NOTE
She is supposed take vitamin D 5000 IU daily but she is not taking any vitamin D supplement. We will follow vitamin D level and advise accordingly.

## 2023-11-14 NOTE — ASSESSMENT & PLAN NOTE
She has a epigastric and lower abdominal pain and also diarrhea possible acute viral gastroenteritis. Rule out other etiology. Due to her other symptoms of near syncope and diaphoresis she was referred to emergency room. See above under near syncope assessment and plan for detail.

## 2024-02-21 ENCOUNTER — TELEPHONE (OUTPATIENT)
Age: 59
End: 2024-02-21

## 2024-02-21 DIAGNOSIS — E55.9 VITAMIN D DEFICIENCY: ICD-10-CM

## 2024-02-21 DIAGNOSIS — Z12.31 ENCOUNTER FOR SCREENING MAMMOGRAM FOR BREAST CANCER: Primary | ICD-10-CM

## 2024-02-21 DIAGNOSIS — R73.9 HYPERGLYCEMIA: ICD-10-CM

## 2024-02-21 DIAGNOSIS — E78.2 MIXED HYPERLIPIDEMIA: Primary | ICD-10-CM

## 2024-02-21 DIAGNOSIS — R31.29 OTHER MICROSCOPIC HEMATURIA: ICD-10-CM

## 2024-02-21 DIAGNOSIS — D72.828 OTHER ELEVATED WHITE BLOOD CELL (WBC) COUNT: ICD-10-CM

## 2024-02-21 NOTE — TELEPHONE ENCOUNTER
Pt knew last time she seent him she got really sick in the office and thought she left with blood work orders  but I didn't see any so wondering if you can put in all screening bloodwork and mammo order

## 2024-02-22 ENCOUNTER — OFFICE VISIT (OUTPATIENT)
Dept: INTERNAL MEDICINE CLINIC | Facility: CLINIC | Age: 59
End: 2024-02-22
Payer: COMMERCIAL

## 2024-02-22 VITALS
RESPIRATION RATE: 18 BRPM | OXYGEN SATURATION: 98 % | HEART RATE: 85 BPM | BODY MASS INDEX: 33.23 KG/M2 | DIASTOLIC BLOOD PRESSURE: 80 MMHG | SYSTOLIC BLOOD PRESSURE: 130 MMHG | WEIGHT: 176 LBS | TEMPERATURE: 97.8 F | HEIGHT: 61 IN

## 2024-02-22 DIAGNOSIS — D72.828 OTHER ELEVATED WHITE BLOOD CELL (WBC) COUNT: ICD-10-CM

## 2024-02-22 DIAGNOSIS — R31.29 MICROSCOPIC HEMATURIA: ICD-10-CM

## 2024-02-22 DIAGNOSIS — M25.422 PAIN AND SWELLING OF LEFT ELBOW: Primary | ICD-10-CM

## 2024-02-22 DIAGNOSIS — N20.0 RENAL CALCULI: ICD-10-CM

## 2024-02-22 DIAGNOSIS — E55.9 VITAMIN D DEFICIENCY: ICD-10-CM

## 2024-02-22 DIAGNOSIS — M25.522 PAIN AND SWELLING OF LEFT ELBOW: Primary | ICD-10-CM

## 2024-02-22 DIAGNOSIS — R73.9 HYPERGLYCEMIA: ICD-10-CM

## 2024-02-22 DIAGNOSIS — E78.2 MIXED HYPERLIPIDEMIA: ICD-10-CM

## 2024-02-22 DIAGNOSIS — K63.5 POLYP OF COLON, UNSPECIFIED PART OF COLON, UNSPECIFIED TYPE: ICD-10-CM

## 2024-02-22 PROBLEM — D72.829 LEUKOCYTOSIS: Status: ACTIVE | Noted: 2024-02-22

## 2024-02-22 PROCEDURE — 99214 OFFICE O/P EST MOD 30 MIN: CPT | Performed by: INTERNAL MEDICINE

## 2024-02-22 RX ORDER — NAPROXEN 500 MG/1
500 TABLET ORAL 2 TIMES DAILY WITH MEALS
Qty: 20 TABLET | Refills: 0 | Status: SHIPPED | OUTPATIENT
Start: 2024-02-22

## 2024-02-22 RX ORDER — CLOBETASOL PROPIONATE 0.46 MG/ML
SOLUTION TOPICAL
COMMUNITY

## 2024-02-22 NOTE — PROGRESS NOTES
Assessment/Plan:    1. Pain and swelling of left elbow  -     naproxen (Naprosyn) 500 mg tablet; Take 1 tablet (500 mg total) by mouth 2 (two) times a day with meals  -     XR elbow 2 vw left; Future; Expected date: 02/23/2024    2. Mixed hyperlipidemia  -     Lipid panel; Future  -     Lipid panel    3. Vitamin D deficiency  -     Vitamin D 25 hydroxy; Future  -     Vitamin D 25 hydroxy    4. Renal calculi  -     Ambulatory Referral to Urology; Future  -     Basic metabolic panel; Future  -     Basic metabolic panel    5. Microscopic hematuria  -     Ambulatory Referral to Urology; Future  -     Basic metabolic panel; Future  -     UA (URINE) with reflex to Scope; Future  -     Basic metabolic panel  -     UA (URINE) with reflex to Scope    6. Hyperglycemia  -     Basic metabolic panel; Future  -     Hemoglobin A1C; Future  -     UA (URINE) with reflex to Scope; Future  -     Basic metabolic panel  -     Hemoglobin A1C  -     UA (URINE) with reflex to Scope    7. Polyp of colon, unspecified part of colon, unspecified type    8. Other elevated white blood cell (WBC) count  -     CBC and differential; Future  -     CBC and differential    9. BMI 33.0-33.9,adult  Assessment & Plan:  Patient  was advised to decrease portion size.  Advised to decrease carb, sugar, cholesterol intake.  Advised to exercise 3-5 times per week.  Advised to lose weight.         Discussion/summary/plan:    Last cholesterol 212, triglyceride 205, HDL 43,  patient states he has been watching her diet for cholesterol carbs intake.  Will follow lipid panel and advise accordingly.  She has history of vitamin D deficiency.  Is not taking vitamin D supplement.  Discussed with the patient consequences vitamin D deficiency.  Will check vitamin D level and advise accordingly.  She has a CT scan of abdomen pelvis November 2023 revealed renal calculi.  Presently asymptomatic.  She also has microscopic hematuria at that time most likely from UTI.   Presently asymptomatic.  Will repeat urinalysis.  Will refer to urologist.  Her blood sugar 114 in the emergency room November 2023 most likely postprandial and infection will check fasting blood sugar and hemoglobin A1c.  She has a history of colon polyp last colonoscopy normal 2015 she was advised to go for colonoscopy patient refused.  She was advised for Cologuard but she refused.  Her white cell count was 14.1 in the emergency room November 2020 most likely secondary infection will repeat CBC she has a pain and swelling of the left elbow lateral aspect does not recall any injury.  On exam she has a tenderness lateral aspect of the left elbow with some swelling questionable soft lump.  No redness noted.  Has a good range of motion of left elbow.  Rule out bursitis, tendinitis, versus other etiology.  Will obtain x-ray of the left elbow start naproxen and warm compresses.  Follow-up with me in 10 days if not better will need further evaluation may need to get CT scan/referral to specialist.    Subjective: Patient presents for follow-up of her medical problems and complaint of left elbow pain and swelling.      Patient ID: Nenita De Jesus is a 58 y.o. female.    Arm Pain   Pertinent negatives include no chest pain.     58-year-old white female patient presents to follow-up her medical problems.  Patient complain of pain and swelling of the left elbow lateral aspect.  Patient does not recall any injury.  Denies any chest pain, shortness of breath, pain in abdomen.  Denies any cough, fever, chills.  Denies any nausea vomiting diarrhea.  Denies any flank pain.  Denies blood in the stool or dark-colored stool.  Denies blood in the urine or UTI symptoms.    The following portions of the patient's history were reviewed and updated as appropriate:     Past Medical History:  She has a past medical history of Adenomyosis, BMI 33.0-33.9,adult (02/22/2024), BMI 34.0-34.9,adult (06/15/2021), Colon polyp (06/15/2021),  Diaphoresis (11/13/2023), Elevated BP without diagnosis of hypertension (06/15/2021), Fatigue, Hyperglycemia (02/22/2024), Hypertriglyceridemia (06/13/2021), Leukocytosis (02/22/2024), Low HDL (under 40) (06/13/2021), Microscopic hematuria, Microscopic hematuria (02/22/2024), Mixed hyperlipidemia (06/15/2021), Multiple thyroid nodules (06/13/2021), Near syncope (11/13/2023), Pain and swelling of left elbow (02/22/2024), Pain in the abdomen (11/13/2023), Renal calculi (02/22/2024), Thyroid disease, Urinary frequency (11/13/2023), Vitamin D deficiency (06/13/2021), and Weight gain.,  _______________________________________________________________________  Past Surgical History:   has a past surgical history that includes Laparoscopic cholecystectomy; Dilation and curettage of uterus; Polypectomy; Hysteroscopy; Laparoscopic total hysterectomy; Hysterectomy; Mammo (historical) (12/23/2020); Colonoscopy (11/02/2015); Colonoscopy (03/23/2022); and EGD (05/03/2022).,  _______________________________________________________________________  Family History:  family history includes Breast cancer in her maternal aunt; Heart attack in her father; Heart disease in her father; No Known Problems in her daughter, maternal aunt, maternal aunt, maternal aunt, maternal grandfather, maternal grandmother, mother, paternal aunt, paternal grandfather, paternal grandmother, and son.,  _______________________________________________________________________  Social History:   reports that she has never smoked. She has never used smokeless tobacco. She reports current alcohol use of about 1.0 - 2.0 standard drink of alcohol per week. She reports that she does not use drugs.,  _______________________________________________________________________  Allergies:  is allergic to gluten meal - food allergy..  _______________________________________________________________________  Current Outpatient Medications   Medication Sig Dispense Refill     "naproxen (Naprosyn) 500 mg tablet Take 1 tablet (500 mg total) by mouth 2 (two) times a day with meals 20 tablet 0    clobetasol (TEMOVATE) 0.05 % external solution clobetasol 0.05 % scalp solution   APPLY TO SCALP AT BEDTIME       No current facility-administered medications for this visit.     _______________________________________________________________________  Review of Systems   Constitutional:  Negative for chills and fever.   HENT:  Negative for congestion, ear pain, hearing loss, nosebleeds, sinus pain, sore throat and trouble swallowing.    Eyes:  Negative for discharge, redness and visual disturbance.   Respiratory:  Negative for cough, chest tightness and shortness of breath.    Cardiovascular:  Negative for chest pain and palpitations.   Gastrointestinal:  Negative for abdominal pain, blood in stool, constipation, diarrhea, nausea and vomiting.   Genitourinary:  Negative for dysuria, flank pain, frequency, hematuria and urgency.   Musculoskeletal:  Positive for arthralgias (Left elbow pain and swelling). Negative for myalgias and neck pain.   Skin:  Negative for color change and rash.   Neurological:  Negative for dizziness, speech difficulty, weakness and headaches.   Hematological:  Does not bruise/bleed easily.   Psychiatric/Behavioral:  Negative for agitation and behavioral problems.            Objective:  Vitals:    02/22/24 0858   BP: 130/80   BP Location: Left arm   Patient Position: Sitting   Cuff Size: Standard   Pulse: 85   Resp: 18   Temp: 97.8 °F (36.6 °C)   TempSrc: Temporal   SpO2: 98%   Weight: 79.8 kg (176 lb)   Height: 5' 1\" (1.549 m)     Body mass index is 33.25 kg/m².     Physical Exam  Vitals and nursing note reviewed.   Constitutional:       General: She is not in acute distress.     Appearance: She is obese.   HENT:      Head: Normocephalic and atraumatic.      Right Ear: Ear canal and external ear normal.      Left Ear: Ear canal and external ear normal.      Nose: Nose normal. "      Mouth/Throat:      Mouth: Mucous membranes are moist.      Pharynx: Oropharynx is clear.   Eyes:      General: No scleral icterus.        Right eye: No discharge.         Left eye: No discharge.      Extraocular Movements: Extraocular movements intact.      Conjunctiva/sclera: Conjunctivae normal.   Cardiovascular:      Rate and Rhythm: Normal rate and regular rhythm.      Pulses: Normal pulses.      Heart sounds: Normal heart sounds. No murmur heard.  Pulmonary:      Effort: Pulmonary effort is normal. No respiratory distress.      Breath sounds: Normal breath sounds. No wheezing, rhonchi or rales.   Abdominal:      General: Bowel sounds are normal. There is no distension.      Palpations: Abdomen is soft.      Tenderness: There is no abdominal tenderness. There is no right CVA tenderness or left CVA tenderness.   Musculoskeletal:         General: Tenderness (Left elbow tender and swollen lateral aspect.   has a questionable soft lump lateral aspect.  No redness noted.  Good range of motion of left elbow.) present. Normal range of motion.      Cervical back: Normal range of motion and neck supple. No muscular tenderness.      Right lower leg: No edema.      Left lower leg: No edema.   Skin:     General: Skin is warm.      Findings: No rash.   Neurological:      General: No focal deficit present.      Mental Status: She is alert and oriented to person, place, and time.      Motor: No weakness.      Coordination: Coordination normal.   Psychiatric:         Mood and Affect: Mood normal.         Behavior: Behavior normal.       I spent 30 minutes with the patient today.  More than 50% time spent for reviewing of external notes, reviewing of the results of diagnostics test, management of care, patient education and ordering of test.

## 2024-02-23 LAB
25(OH)D3 SERPL-MCNC: 20 NG/ML (ref 30–100)
APPEARANCE UR: CLEAR
BACTERIA UR QL AUTO: ABNORMAL /HPF
BASOPHILS # BLD AUTO: 30 CELLS/UL (ref 0–200)
BASOPHILS NFR BLD AUTO: 0.6 %
BILIRUB UR QL STRIP: NEGATIVE
BUN SERPL-MCNC: 17 MG/DL (ref 7–25)
BUN/CREAT SERPL: NORMAL (CALC) (ref 6–22)
CALCIUM SERPL-MCNC: 9 MG/DL (ref 8.6–10.4)
CHLORIDE SERPL-SCNC: 103 MMOL/L (ref 98–110)
CHOLEST SERPL-MCNC: 203 MG/DL
CHOLEST/HDLC SERPL: 4.6 (CALC)
CO2 SERPL-SCNC: 28 MMOL/L (ref 20–32)
COLOR UR: YELLOW
CREAT SERPL-MCNC: 0.62 MG/DL (ref 0.5–1.03)
EOSINOPHIL # BLD AUTO: 120 CELLS/UL (ref 15–500)
EOSINOPHIL NFR BLD AUTO: 2.4 %
ERYTHROCYTE [DISTWIDTH] IN BLOOD BY AUTOMATED COUNT: 12.3 % (ref 11–15)
GFR/BSA.PRED SERPLBLD CYS-BASED-ARV: 104 ML/MIN/1.73M2
GLUCOSE SERPL-MCNC: 84 MG/DL (ref 65–99)
GLUCOSE UR QL STRIP: NEGATIVE
HBA1C MFR BLD: 5.7 % OF TOTAL HGB
HCT VFR BLD AUTO: 43.6 % (ref 35–45)
HDLC SERPL-MCNC: 44 MG/DL
HGB BLD-MCNC: 14.3 G/DL (ref 11.7–15.5)
HGB UR QL STRIP: NEGATIVE
HYALINE CASTS #/AREA URNS LPF: ABNORMAL /LPF
KETONES UR QL STRIP: NEGATIVE
LDLC SERPL CALC-MCNC: 128 MG/DL (CALC)
LEUKOCYTE ESTERASE UR QL STRIP: ABNORMAL
LYMPHOCYTES # BLD AUTO: 2030 CELLS/UL (ref 850–3900)
LYMPHOCYTES NFR BLD AUTO: 40.6 %
MCH RBC QN AUTO: 27.7 PG (ref 27–33)
MCHC RBC AUTO-ENTMCNC: 32.8 G/DL (ref 32–36)
MCV RBC AUTO: 84.5 FL (ref 80–100)
MONOCYTES # BLD AUTO: 470 CELLS/UL (ref 200–950)
MONOCYTES NFR BLD AUTO: 9.4 %
NEUTROPHILS # BLD AUTO: 2350 CELLS/UL (ref 1500–7800)
NEUTROPHILS NFR BLD AUTO: 47 %
NITRITE UR QL STRIP: NEGATIVE
NONHDLC SERPL-MCNC: 159 MG/DL (CALC)
PH UR STRIP: 6 [PH] (ref 5–8)
PLATELET # BLD AUTO: 307 THOUSAND/UL (ref 140–400)
PMV BLD REES-ECKER: 9.1 FL (ref 7.5–12.5)
POTASSIUM SERPL-SCNC: 4.4 MMOL/L (ref 3.5–5.3)
PROT UR QL STRIP: NEGATIVE
RBC # BLD AUTO: 5.16 MILLION/UL (ref 3.8–5.1)
RBC #/AREA URNS HPF: ABNORMAL /HPF
SODIUM SERPL-SCNC: 137 MMOL/L (ref 135–146)
SP GR UR STRIP: 1.01 (ref 1–1.03)
SQUAMOUS #/AREA URNS HPF: ABNORMAL /HPF
TRIGL SERPL-MCNC: 191 MG/DL
WBC # BLD AUTO: 5 THOUSAND/UL (ref 3.8–10.8)
WBC #/AREA URNS HPF: ABNORMAL /HPF

## 2024-02-26 ENCOUNTER — RA CDI HCC (OUTPATIENT)
Dept: OTHER | Facility: HOSPITAL | Age: 59
End: 2024-02-26

## 2024-02-26 ENCOUNTER — TELEPHONE (OUTPATIENT)
Dept: INTERNAL MEDICINE CLINIC | Facility: CLINIC | Age: 59
End: 2024-02-26

## 2024-02-26 NOTE — TELEPHONE ENCOUNTER
----- Message from Kermit Wyatt MD sent at 2/25/2024 10:50 AM EST -----  Please call her tomorrow that her cholesterol decreased from 222 to  203 and triglyceride decreased from 205 to 191 but still elevated.  I will discuss with her in detail at her next appointment on March 4, 2024.  Advised she need to continue to watch diet for cholesterol and carbs advised to exercise lose weight .  Her vitamin D level is low at 20 so advise to start vitamin D 5000 IU tablet to take once a day.  Her hemoglobin A1c is 5.7 consistent with the prediabetes advised to watch diet for sugar and carbs intake.  Other blood tests are unremarkable.  To see me as scheduled March 4, 2024.

## 2024-02-26 NOTE — PROGRESS NOTES
HCC coding opportunities       Chart reviewed, no opportunity found: CHART REVIEWED, NO OPPORTUNITY FOUND        Patients Insurance        Commercial Insurance: [employer]

## 2024-03-04 ENCOUNTER — TELEPHONE (OUTPATIENT)
Dept: INTERNAL MEDICINE CLINIC | Facility: CLINIC | Age: 59
End: 2024-03-04

## 2024-05-02 ENCOUNTER — TELEPHONE (OUTPATIENT)
Age: 59
End: 2024-05-02

## 2024-05-02 NOTE — TELEPHONE ENCOUNTER
Spoke with PT. She said she received a letter from the office to call regarding her lab results. I informed her of message below. She verbalized understanding.     Sierna Monroe LPN  2/28/2024  4:03 PM EST Back to Top      [employer] message sent to patient.    Sirena Monroe LPN  2/28/2024  9:39 AM EST       LVM to call office    Sirean Monroe LPN  2/26/2024  9:36 AM EST       LVM for patient to call office.    Kermit Wyatt MD  2/25/2024 10:50 AM EST       Please call her tomorrow that her cholesterol decreased from 222 to  203 and triglyceride decreased from 205 to 191 but still elevated.  I will discuss with her in detail at her next appointment on March 4, 2024.  Advised she need to continue to watch diet for cholesterol and carbs advised to exercise lose weight .  Her vitamin D level is low at 20 so advise to start vitamin D 5000 IU tablet to take once a day.  Her hemoglobin A1c is 5.7 consistent with the prediabetes advised to watch diet for sugar and carbs intake.  Other blood tests are unremarkable.  To see me as scheduled March 4, 2024.

## 2024-05-08 ENCOUNTER — HOSPITAL ENCOUNTER (OUTPATIENT)
Dept: MAMMOGRAPHY | Facility: HOSPITAL | Age: 59
Discharge: HOME | End: 2024-05-08
Attending: INTERNAL MEDICINE
Payer: COMMERCIAL

## 2024-05-08 VITALS — HEIGHT: 61 IN | WEIGHT: 176 LBS | BODY MASS INDEX: 33.23 KG/M2

## 2024-05-08 DIAGNOSIS — Z12.31 ENCOUNTER FOR SCREENING MAMMOGRAM FOR BREAST CANCER: ICD-10-CM

## 2024-05-08 PROCEDURE — 77067 SCR MAMMO BI INCL CAD: CPT

## 2024-05-08 PROCEDURE — 77063 BREAST TOMOSYNTHESIS BI: CPT

## 2024-05-09 ENCOUNTER — TELEPHONE (OUTPATIENT)
Dept: INTERNAL MEDICINE CLINIC | Facility: CLINIC | Age: 59
End: 2024-05-09

## 2024-05-09 NOTE — TELEPHONE ENCOUNTER
----- Message from Kermit Wyatt MD sent at 5/8/2024 12:37 PM EDT -----  Please call patient that her mammogram is okay she will need a follow-up mammogram after 1 year.

## 2024-07-02 ENCOUNTER — OFFICE VISIT (OUTPATIENT)
Dept: UROLOGY | Facility: CLINIC | Age: 59
End: 2024-07-02
Payer: COMMERCIAL

## 2024-07-02 VITALS
OXYGEN SATURATION: 96 % | BODY MASS INDEX: 33.23 KG/M2 | HEART RATE: 78 BPM | SYSTOLIC BLOOD PRESSURE: 140 MMHG | DIASTOLIC BLOOD PRESSURE: 80 MMHG | WEIGHT: 176 LBS | HEIGHT: 61 IN

## 2024-07-02 DIAGNOSIS — N20.0 RENAL CALCULI: ICD-10-CM

## 2024-07-02 DIAGNOSIS — R31.29 MICROSCOPIC HEMATURIA: Primary | ICD-10-CM

## 2024-07-02 LAB
SL AMB  POCT GLUCOSE, UA: NORMAL
SL AMB LEUKOCYTE ESTERASE,UA: NORMAL
SL AMB POCT BILIRUBIN,UA: NORMAL
SL AMB POCT BLOOD,UA: NORMAL
SL AMB POCT CLARITY,UA: CLEAR
SL AMB POCT COLOR,UA: YELLOW
SL AMB POCT KETONES,UA: NORMAL
SL AMB POCT NITRITE,UA: NORMAL
SL AMB POCT PH,UA: 5
SL AMB POCT SPECIFIC GRAVITY,UA: 1.02
SL AMB POCT URINE PROTEIN: NORMAL
SL AMB POCT UROBILINOGEN: 0.2

## 2024-07-02 PROCEDURE — 81002 URINALYSIS NONAUTO W/O SCOPE: CPT | Performed by: PHYSICIAN ASSISTANT

## 2024-07-02 PROCEDURE — 99203 OFFICE O/P NEW LOW 30 MIN: CPT | Performed by: PHYSICIAN ASSISTANT

## 2024-07-02 NOTE — PROGRESS NOTES
UROLOGY CONSULTATION NOTE     Patient Identifiers: Nenita De Jesus (MRN: [id number])  Service Requesting Consultation:Kermit Wyatt MD     Service Providing Consultation:  Urology, Eddie Rodas PA-C  Consults  Date of Service: 7/2/2024    Reason for Consultation: Hematuria and kidney stone follow-up    History of Present Illness:     Nenita De Jesus is a 58 y.o. female referred from primary care for evaluation of hematuria.  In November she had gross hematuria and flank pain.  It appears she had a 2 mm stone at the left UVJ.  She also appears to have had a urinary tract infection at that time.  Her subsequent urine studies have been clear.  She is now asymptomatic.  She did have a 7 mm stone in her right kidney.  Never had stones before.  She had 2 children and is a non-smoker.  She has good urinary control other than occasional minor ROSHAN.    Past Medical, Past Surgical History:     Past Medical History:   Diagnosis Date    Adenomyosis     BMI 33.0-33.9,adult 02/22/2024    BMI 34.0-34.9,adult 06/15/2021    Colon polyp 06/15/2021    Diaphoresis 11/13/2023    Elevated BP without diagnosis of hypertension 06/15/2021    Fatigue     Hyperglycemia 02/22/2024    Hypertriglyceridemia 06/13/2021    Leukocytosis 02/22/2024    Low HDL (under 40) 06/13/2021    Microscopic hematuria     Microscopic hematuria 02/22/2024    Mixed hyperlipidemia 06/15/2021    Multiple thyroid nodules 06/13/2021    Near syncope 11/13/2023    Pain and swelling of left elbow 02/22/2024    Pain in the abdomen 11/13/2023    Renal calculi 02/22/2024    Thyroid disease     Urinary frequency 11/13/2023    Vitamin D deficiency 06/13/2021    Weight gain    :    Past Surgical History:   Procedure Laterality Date    COLONOSCOPY  11/02/2015    by Dr. Paniagua; advise f/u in 5 yrs, per pt    COLONOSCOPY  03/23/2022    1 polyp tubular adenoma biopsy negative for microscopic colitis    DILATION AND CURETTAGE OF UTERUS      EGD  05/03/2022     Gastric polyps-biopsy showed fundic gland polyp, nonsevere esophagitis biopsy showed reflux, biopsies stomach negative for H. pylori, biopsy of the duodenum negative for celiac by Dr. Lee    HYSTERECTOMY      HYSTEROSCOPY      LAPAROSCOPIC CHOLECYSTECTOMY      LAPAROSCOPIC TOTAL HYSTERECTOMY      [facility]    POLYPECTOMY     :    Medications, Allergies:     Current Outpatient Medications:     clobetasol (TEMOVATE) 0.05 % external solution, clobetasol 0.05 % scalp solution  APPLY TO SCALP AT BEDTIME, Disp: , Rfl:     naproxen (Naprosyn) 500 mg tablet, Take 1 tablet (500 mg total) by mouth 2 (two) times a day with meals, Disp: 20 tablet, Rfl: 0    Allergies:  Allergies   Allergen Reactions    Gluten Meal - Food Allergy Diarrhea   :    Social and Family History:   Social History:   Social History     Tobacco Use    Smoking status: Never    Smokeless tobacco: Never   Vaping Use    Vaping status: Never Used   Substance Use Topics    Alcohol use: Yes     Alcohol/week: 1.0 - 2.0 standard drink of alcohol     Types: 1 - 2 Standard drinks or equivalent per week     Comment: Nondrinker - As per [employer]    Drug use: No   .    Social History     Tobacco Use   Smoking Status Never   Smokeless Tobacco Never       Family History:  Family History   Problem Relation Age of Onset    No Known Problems Mother     Heart attack Father     Heart disease Father     No Known Problems Daughter     No Known Problems Maternal Grandmother     No Known Problems Maternal Grandfather     No Known Problems Paternal Grandmother     No Known Problems Paternal Grandfather     No Known Problems Son     Breast cancer Maternal Aunt     No Known Problems Maternal Aunt     No Known Problems Maternal Aunt     No Known Problems Maternal Aunt     No Known Problems Paternal Aunt    :     Review of Systems:     General: Fever, chills, or night sweats: negative  Cardiac: Negative for chest pain.    Pulmonary:  "Negative for shortness of breath.  Gastrointestinal: Abdominal pain negative.  Nausea, vomiting, or diarrhea negative,  Genitourinary: See HPI above.  Patient does not have hematuria.  All other systems queried were negative.    Physical Exam:   General: Patient is pleasant and in NAD. Awake and alert  /80 (BP Location: Left arm, Patient Position: Sitting, Cuff Size: Adult)   Pulse 78   Ht 5' 1\" (1.549 m)   Wt 79.8 kg (176 lb)   SpO2 96%   BMI 33.25 kg/m²   HEENT:  Conjunctiva are clear  Constitutional:  pleasant and cooperative     no apparent distress  Cardiac: Peripheral edema: negative  Pulmonary: Non-labored breathing  Abdomen: Soft, non-tender, non-distended.  No surgical scars.  No masses, tenderness, hernias noted.    Genitourinary: Negative CVA tenderness, negative suprapubic tenderness.  Extremities:  Moves all extremities  Neurological:CNII-XII intact. No numbness or tingling. Essentially non focal neurologic exam  Psychiatric:mood affect and behavior normal      Labs:     Lab Results   Component Value Date    HGB 14.3 02/23/2024    HCT 43.6 02/23/2024    WBC 5.0 02/23/2024     02/23/2024   ]    Lab Results   Component Value Date    K 4.4 02/23/2024     02/23/2024    CO2 28 02/23/2024    BUN 17 02/23/2024    CREATININE 0.62 02/23/2024    CALCIUM 9.0 02/23/2024   ]    Imaging:   I personally reviewed the images and report of the following studies, and reviewed them with the patient:   IMPRESSION:     There is a 7 mm right lower pole nonobstructing nephrolithiasis. There is a 2 mm   calcification adjacent to the left ureterovesical junction which is difficult to   determine if this is a nonobstructing distal left ureter calculi versus adjacent   punctate phlebolith, however no hydroureteronephrosis or secondary signs of   obstructive uropathy are identified. Correlate for site of abdominopelvic pain   and if there remains clinical concern for left distal ureter calculi, recommend "   CT urogram for further evaluation.       ASSESSMENT:     #1.  Nephrolithiasis  #2.  Hematuria    PLAN:   -Follow-up in 4 months with ultrasound and KUB prior to visit we will recheck her urine at that time  -Thus far her follow-up urinalysis have been clear it appears her hematuria was from a small stone.    Thank you for allowing me to participate in this patients’ care.  Please do not hesitate to call with any additional questions.  Eddie Rodas PA-C

## 2024-08-23 NOTE — PROGRESS NOTES
----- Message from Catalina ORTEGA sent at 8/7/2024  2:47 PM CDT -----  Regarding: REFERRAL  Patient referred for high risk breast cancer please advise     Assessment  1  Reaction to insect bite (919 4,E906 4) (W57 XXXA)    Plan  Reaction to insect bite    · PredniSONE 20 MG Oral Tablet; Take 1 tablet twice daily    Discussion/Summary  Discussion Summary:   1  Take Prednisone 20mg  1 tablet twice daily until clearBenadryl as needed for redness/itchingCool compresses to area 10-15 min 3-4x dailyFollow-up with PCP if symptoms persist    Medication Side Effects Reviewed: Possible side effects of new medications were reviewed with the patient/guardian today  Understands and agrees with treatment plan: The treatment plan was reviewed with the patient/guardian  The patient/guardian understands and agrees with the treatment plan      Chief Complaint  Chief Complaint Free Text Note Form: Patient presents with redness and swelling after being stung by insect on R arm      History of Present Illness  HPI: Patient is a 31-year-old female presents with redness and swelling of her right upper arm after being stung with a bee one day prior  She denies any visible hives, swelling of her lips her mucous membranes or vomiting  Hospital Based Practices Required Assessment:   Pain Assessment   the patient states they have pain  The pain is located in the R upper arm  (on a scale of 0 to 10, the patient rates the pain at 8 )   Abuse And Domestic Violence Screen    Yes, the patient is safe at home -The patient states no one is hurting them  Depression And Suicide Screen  No, the patient has not had thoughts of hurting themself  No, the patient has not felt depressed in the past 7 days  Prefered Language is  english  Primary Language is  english  Review of Systems  Focused-Female:   Constitutional: No fever, no chills, feels well, no tiredness, no recent weight gain or loss  Integumentary: rash, but-no skin lesions-and-no skin wound  Active Problems  1  Abnormal weight gain (783 1) (R63 5)   2  Encounter for routine gynecological examination (V72 31) (Z01 419)   3  Encounter for screening mammogram for malignant neoplasm of breast (V76 12)   (Z12 31)   4  Mammogram abnormal (793 80) (R92 8)   5  Menopausal symptoms (627 2) (N95 1)    Past Medical History  1  History of Adenomyosis (617 0) (N80 0)   2  History of thyroid disease (V12 29) (Z86 39)   3  History of Summary Of Previous Pregnancies  2  (Total No )   4  History of Summary Of Previous Pregnancies Para 2  (Deliveries)  Active Problems And Past Medical History Reviewed: The active problems and past medical history were reviewed and updated today  Family History  Family History    1  Family history of Heart Disease (V17 49)  Family History Reviewed: The family history was reviewed and updated today  Social History   · Child's Family History   ·    · Never A Smoker  Social History Reviewed: The social history was reviewed and updated today  Surgical History  1  History of Cholecystectomy Laparoscopic   2  History of Dilation And Curettage   3  History of Enteroscopic Polypectomy   4  History of Hysteroscopy   5  History of Laparoscopy With Total Hysterectomy  Surgical History Reviewed: The surgical history was reviewed and updated today  Current Meds   1  No Reported Medications Recorded  Medication List Reviewed: The medication list was reviewed and updated today  Allergies  1  No Known Drug Allergies    Vitals  Signs   Recorded: 55SFF1912 06:48KQ   Systolic: 736  Diastolic: 84  Recorded: 95HOO3141 11:31AM   Temperature: 98 1 F, Temporal  Heart Rate: 77  Respiration: 18  Weight: 169 lb   BMI Calculated: 31 93  BSA Calculated: 1 76  O2 Saturation: 99  Pain Scale: 8    Physical Exam    Constitutional   General appearance: No acute distress, well appearing and well nourished  Ears, Nose, Mouth, and Throat   External inspection of ears and nose: Normal     Otoscopic examination: Tympanic membranes translucent with normal light reflex  Canals patent without erythema  Nasal mucosa, septum, and turbinates: Normal without edema or erythema  Oropharynx: Normal with no erythema, edema, exudate or lesions  Pulmonary   Respiratory effort: No increased work of breathing or signs of respiratory distress  Auscultation of lungs: Clear to auscultation  Cardiovascular   Auscultation of heart: Normal rate and rhythm, normal S1 and S2, without murmurs  Skin   Skin and subcutaneous tissue: Abnormal  -3x5cm oval, erythematous patch noted on the right upper arm, no visible hives or warmth to touch        Signatures   Electronically signed by : Sukhi Archibald, [facility]; Oct  8 2017 11:40AM EST                       (Author)    Electronically signed by : ROSA Devlin ; Oct 12 2017  2:54PM EST                       (Co-author)

## 2024-11-04 ENCOUNTER — OFFICE VISIT (OUTPATIENT)
Dept: UROLOGY | Facility: CLINIC | Age: 59
End: 2024-11-04
Payer: COMMERCIAL

## 2024-11-04 VITALS
OXYGEN SATURATION: 97 % | BODY MASS INDEX: 33.42 KG/M2 | DIASTOLIC BLOOD PRESSURE: 82 MMHG | HEIGHT: 61 IN | WEIGHT: 177 LBS | HEART RATE: 78 BPM | SYSTOLIC BLOOD PRESSURE: 138 MMHG

## 2024-11-04 DIAGNOSIS — N20.0 RENAL CALCULI: ICD-10-CM

## 2024-11-04 DIAGNOSIS — R31.29 MICROSCOPIC HEMATURIA: Primary | ICD-10-CM

## 2024-11-04 LAB
SL AMB  POCT GLUCOSE, UA: NORMAL
SL AMB LEUKOCYTE ESTERASE,UA: NORMAL
SL AMB POCT BILIRUBIN,UA: NORMAL
SL AMB POCT BLOOD,UA: NORMAL
SL AMB POCT CLARITY,UA: CLEAR
SL AMB POCT COLOR,UA: YELLOW
SL AMB POCT KETONES,UA: NORMAL
SL AMB POCT NITRITE,UA: NORMAL
SL AMB POCT PH,UA: 5
SL AMB POCT SPECIFIC GRAVITY,UA: 1.03
SL AMB POCT URINE PROTEIN: NORMAL
SL AMB POCT UROBILINOGEN: NORMAL

## 2024-11-04 PROCEDURE — 99213 OFFICE O/P EST LOW 20 MIN: CPT

## 2024-11-04 PROCEDURE — 81002 URINALYSIS NONAUTO W/O SCOPE: CPT

## 2024-11-04 RX ORDER — SODIUM PICOSULFATE, MAGNESIUM OXIDE, AND ANHYDROUS CITRIC ACID 10; 3.5; 12 MG/160ML; G/160ML; G/160ML
LIQUID ORAL
COMMUNITY

## 2024-11-04 RX ORDER — IBUPROFEN 800 MG/1
TABLET, FILM COATED ORAL
COMMUNITY
Start: 2024-09-29

## 2024-11-04 RX ORDER — CYCLOBENZAPRINE HCL 5 MG
TABLET ORAL
COMMUNITY
Start: 2024-09-29

## 2024-11-04 NOTE — PROGRESS NOTES
11/4/2024      No chief complaint on file.        Assessment and Plan    58 y.o. female     Nephrolithiasis.  Gross Hematuria  -urinalysis have been clear, appears hematuria previously from a small stone.  -Urine dip today positive for leukocytes and blood. Negative for nitrites. Will send for micro and culture.  -Patient seen at  on 10/02/24 with left sided flank pain. US and KUB showing 4 mm rounded nodular density overlies lower pole right kidney maybe related to bowel. Reported left-sided pain. If symptoms persist, recommend CT stone study.  -Patient completely asymptomatic denies. Denies dysuria, flank pain, frequency, urgency and gross hematuria. We discussed if patient does develop symptoms will order a CT stone study. Otherwise, we will plan to see patient back for follow-up in 6 months with repeat renal US PTV.         History of Present Illness  Nenita De Jesus is a 58 y.o. female here for follow-up of nephrolithiasis.     Previous imaging, CT renal stone study (11/13/23) showing a 7 mm nonobstructing calculus within the right lower pole moiety. There is a punctate 2 mm calcification which appears adjacent to   the left ureterovesical junction.     Patient denies previous stone interventions. Denies hx of recurrent UTIs. Denies dysuria, flank pain, frequency, urgency and gross hematuria.     Review of Systems   Constitutional:  Negative for chills and fever.   HENT:  Negative for ear pain and sore throat.    Eyes:  Negative for pain and visual disturbance.   Respiratory:  Negative for cough and shortness of breath.    Cardiovascular:  Negative for chest pain and palpitations.   Gastrointestinal:  Negative for abdominal pain, constipation, nausea and vomiting.   Genitourinary:  Negative for difficulty urinating, dysuria, flank pain, frequency, hematuria and urgency.   Musculoskeletal:  Negative for arthralgias and back pain.   Skin:  Negative for color change and rash.   Neurological:  Negative for seizures  and syncope.   All other systems reviewed and are negative.               Vitals  There were no vitals filed for this visit.    Physical Exam  Constitutional:       Appearance: Normal appearance.   HENT:      Head: Normocephalic.   Eyes:      Extraocular Movements: Extraocular movements intact.      Pupils: Pupils are equal, round, and reactive to light.   Pulmonary:      Effort: Pulmonary effort is normal. No respiratory distress.   Musculoskeletal:         General: Normal range of motion.      Cervical back: Normal range of motion.   Neurological:      Mental Status: She is alert and oriented to person, place, and time.   Psychiatric:         Mood and Affect: Mood normal.         Behavior: Behavior normal.         Thought Content: Thought content normal.         Judgment: Judgment normal.           Past History  Past Medical History:   Diagnosis Date    Adenomyosis     BMI 33.0-33.9,adult 02/22/2024    BMI 34.0-34.9,adult 06/15/2021    Colon polyp 06/15/2021    Diaphoresis 11/13/2023    Elevated BP without diagnosis of hypertension 06/15/2021    Fatigue     Hyperglycemia 02/22/2024    Hypertriglyceridemia 06/13/2021    Leukocytosis 02/22/2024    Low HDL (under 40) 06/13/2021    Microscopic hematuria     Microscopic hematuria 02/22/2024    Mixed hyperlipidemia 06/15/2021    Multiple thyroid nodules 06/13/2021    Near syncope 11/13/2023    Pain and swelling of left elbow 02/22/2024    Pain in the abdomen 11/13/2023    Renal calculi 02/22/2024    Thyroid disease     Urinary frequency 11/13/2023    Vitamin D deficiency 06/13/2021    Weight gain      Social History     Socioeconomic History    Marital status: /Civil Union     Spouse name: Not on file    Number of children: Not on file    Years of education: Not on file    Highest education level: Not on file   Occupational History    Occupation: Homemaker/housewife    Tobacco Use    Smoking status: Never    Smokeless tobacco: Never   Vaping Use    Vaping status:  "Never Used   Substance and Sexual Activity    Alcohol use: Yes     Alcohol/week: 1.0 - 2.0 standard drink of alcohol     Types: 1 - 2 Standard drinks or equivalent per week     Comment: Nondrinker - As per [employer]    Drug use: No    Sexual activity: Not on file   Other Topics Concern    Not on file   Social History Narrative    Lives with spouse    Annual dental checkup: Follows dentist    Pap smear: Advise to follow up with her gyn    - As per [employer]     Social Determinants of Health     Financial Resource Strain: Not on file   Food Insecurity: Not on file   Transportation Needs: Not on file   Physical Activity: Not on file   Stress: Not on file   Social Connections: Not on file   Intimate Partner Violence: Not on file   Housing Stability: Not on file     Social History     Tobacco Use   Smoking Status Never   Smokeless Tobacco Never     Family History   Problem Relation Age of Onset    No Known Problems Mother     Heart attack Father     Heart disease Father     No Known Problems Daughter     No Known Problems Maternal Grandmother     No Known Problems Maternal Grandfather     No Known Problems Paternal Grandmother     No Known Problems Paternal Grandfather     No Known Problems Son     Breast cancer Maternal Aunt     No Known Problems Maternal Aunt     No Known Problems Maternal Aunt     No Known Problems Maternal Aunt     No Known Problems Paternal Aunt        The following portions of the patient's history were reviewed and updated as appropriate: allergies, current medications, past medical history, past social history, past surgical history and problem list.    Results  No results found for this or any previous visit (from the past 1 hour(s)).]  No results found for: \"PSA\"  Lab Results   Component Value Date    CALCIUM 9.0 02/23/2024    K 4.4 02/23/2024    CO2 28 02/23/2024     02/23/2024    BUN 17 02/23/2024    CREATININE 0.62 02/23/2024     Lab Results   Component Value Date    WBC 5.0 " 02/23/2024    HGB 14.3 02/23/2024    HCT 43.6 02/23/2024    MCV 84.5 02/23/2024     02/23/2024       LAURENCE Valerio

## 2024-11-06 ENCOUNTER — TELEPHONE (OUTPATIENT)
Dept: UROLOGY | Facility: CLINIC | Age: 59
End: 2024-11-06

## 2024-11-06 LAB
APPEARANCE UR: CLEAR
BACTERIA UR CULT: NORMAL
BACTERIA URNS QL MICRO: NORMAL
BILIRUB UR QL STRIP: NEGATIVE
CASTS URNS QL MICRO: NORMAL /LPF
COLOR UR: YELLOW
EPI CELLS #/AREA URNS HPF: NORMAL /HPF (ref 0–10)
GLUCOSE UR QL: NEGATIVE
HGB UR QL STRIP: NEGATIVE
KETONES UR QL STRIP: NEGATIVE
LEUKOCYTE ESTERASE UR QL STRIP: NEGATIVE
Lab: NO GROWTH
MICRO URNS: NORMAL
MICRO URNS: NORMAL
NITRITE UR QL STRIP: NEGATIVE
PH UR STRIP: 5.5 [PH] (ref 5–7.5)
PROT UR QL STRIP: NEGATIVE
RBC #/AREA URNS HPF: NORMAL /HPF (ref 0–2)
SP GR UR: 1.02 (ref 1–1.03)
UROBILINOGEN UR STRIP-ACNC: 0.2 MG/DL (ref 0.2–1)
WBC #/AREA URNS HPF: NORMAL /HPF (ref 0–5)

## 2024-11-06 NOTE — TELEPHONE ENCOUNTER
Spoke to pt informed pt urine is negative for infection.     ----- Message from LAURENCE Valerio sent at 11/6/2024 11:46 AM EST -----  Patient's urine is negative for infection.

## 2025-01-03 ENCOUNTER — TELEPHONE (OUTPATIENT)
Age: 60
End: 2025-01-03

## 2025-01-03 NOTE — TELEPHONE ENCOUNTER
Patient called in to inform provider of currently being treated for Acute Cystitis. Went to urgent care and was placed on Cipro BID for 3 days. Wanted to make office aware as she was told to call office if there is any changes in urinary health as she may need CT scan. Urgent care did urinalysis, no culture;Please advise.   omponent  Ref Range & Units 1 d ago   Color Yellow   Appearance, Fluid  Clear, Slightly Cloudy, Hazy Slightly Cloudy   Glucose, POC  Negative Negative   Bilirubin, Urine, POC  Negative Negative   Ketone, Urine, POC  Negative Negative   Specific Gravity, Urine, POC  1.003 - 1.030 1.030   RBC, Urine, POC  Negative Moderate (, 1-2+) Abnormal    pH, UA  5.0 - 7.5 5.5   Protein, Urine, POC  Negative Negative   Urobilinogen, Urine, POC 0-0.2mg/dL   Nitrite, Urine, POC  Negative, Indeterminate Negative   Leukocytes, Urine, POC  Negative Small (1+) Abnormal

## 2025-03-04 ENCOUNTER — TELEPHONE (OUTPATIENT)
Dept: INTERNAL MEDICINE CLINIC | Facility: CLINIC | Age: 60
End: 2025-03-04

## 2025-03-04 NOTE — TELEPHONE ENCOUNTER
LVM for patient to call office to schedule Annual PE. Advised to also call if patient has moved or has new PCP.

## 2025-08-11 ENCOUNTER — OFFICE VISIT (OUTPATIENT)
Dept: INTERNAL MEDICINE CLINIC | Facility: CLINIC | Age: 60
End: 2025-08-11
Payer: COMMERCIAL

## 2025-08-11 PROBLEM — M79.645 PAIN OF LEFT THUMB: Status: ACTIVE | Noted: 2025-08-11

## 2025-08-11 PROBLEM — Z00.00 ANNUAL PHYSICAL EXAM: Status: ACTIVE | Noted: 2025-08-11

## 2025-08-11 PROBLEM — R73.03 PREDIABETES: Status: ACTIVE | Noted: 2025-08-11

## 2025-08-15 ENCOUNTER — TELEPHONE (OUTPATIENT)
Age: 60
End: 2025-08-15

## 2025-08-17 ENCOUNTER — RESULTS FOLLOW-UP (OUTPATIENT)
Dept: INTERNAL MEDICINE CLINIC | Facility: CLINIC | Age: 60
End: 2025-08-17